# Patient Record
Sex: FEMALE | Race: WHITE | NOT HISPANIC OR LATINO | URBAN - METROPOLITAN AREA
[De-identification: names, ages, dates, MRNs, and addresses within clinical notes are randomized per-mention and may not be internally consistent; named-entity substitution may affect disease eponyms.]

---

## 2018-09-06 ENCOUNTER — TRANSCRIBE ORDERS (OUTPATIENT)
Dept: ADMINISTRATIVE | Age: 73
End: 2018-09-06

## 2018-09-06 ENCOUNTER — APPOINTMENT (OUTPATIENT)
Dept: LAB | Age: 73
End: 2018-09-06
Payer: MEDICARE

## 2018-09-06 DIAGNOSIS — E03.9 MYXEDEMA HEART DISEASE: ICD-10-CM

## 2018-09-06 DIAGNOSIS — I51.9 MYXEDEMA HEART DISEASE: ICD-10-CM

## 2018-09-06 DIAGNOSIS — J44.9 OBSTRUCTIVE CHRONIC BRONCHITIS WITHOUT EXACERBATION (HCC): ICD-10-CM

## 2018-09-06 DIAGNOSIS — C34.11 MALIGNANT NEOPLASM OF UPPER LOBE OF RIGHT LUNG (HCC): ICD-10-CM

## 2018-09-06 DIAGNOSIS — R00.1 SEVERE SINUS BRADYCARDIA: ICD-10-CM

## 2018-09-06 DIAGNOSIS — Z72.0 TOBACCO ABUSE: ICD-10-CM

## 2018-09-06 DIAGNOSIS — I35.0 NODULAR CALCIFIC AORTIC VALVE STENOSIS: ICD-10-CM

## 2018-09-06 DIAGNOSIS — Z76.89 REFERRAL OF PATIENT WITHOUT EXAMINATION OR TREATMENT: Primary | ICD-10-CM

## 2018-09-06 DIAGNOSIS — Z76.89 REFERRAL OF PATIENT WITHOUT EXAMINATION OR TREATMENT: ICD-10-CM

## 2018-09-06 LAB
CHOLEST SERPL-MCNC: 205 MG/DL (ref 50–200)
HDLC SERPL-MCNC: 75 MG/DL (ref 40–60)
LDLC SERPL CALC-MCNC: 117 MG/DL (ref 0–100)
NONHDLC SERPL-MCNC: 130 MG/DL
TRIGL SERPL-MCNC: 63 MG/DL
TSH 30M P TRH SERPL-ACNC: 3.15 UIU/ML
TSH SERPL DL<=0.05 MIU/L-ACNC: 3.15 UIU/ML

## 2018-09-06 PROCEDURE — 36415 COLL VENOUS BLD VENIPUNCTURE: CPT

## 2018-09-06 PROCEDURE — 84443 ASSAY THYROID STIM HORMONE: CPT

## 2018-09-06 PROCEDURE — 80061 LIPID PANEL: CPT

## 2020-01-07 ENCOUNTER — NURSING HOME VISIT (OUTPATIENT)
Dept: GERIATRICS | Facility: OTHER | Age: 75
End: 2020-01-07
Payer: MEDICARE

## 2020-01-07 VITALS
DIASTOLIC BLOOD PRESSURE: 82 MMHG | TEMPERATURE: 98 F | WEIGHT: 97 LBS | OXYGEN SATURATION: 98 % | RESPIRATION RATE: 20 BRPM | HEART RATE: 92 BPM | SYSTOLIC BLOOD PRESSURE: 102 MMHG

## 2020-01-07 DIAGNOSIS — C34.11 CANCER OF UPPER LOBE OF RIGHT LUNG (HCC): ICD-10-CM

## 2020-01-07 DIAGNOSIS — R63.4 WEIGHT LOSS: ICD-10-CM

## 2020-01-07 DIAGNOSIS — G25.81 RESTLESS LEG: ICD-10-CM

## 2020-01-07 DIAGNOSIS — E03.9 ACQUIRED HYPOTHYROIDISM: ICD-10-CM

## 2020-01-07 DIAGNOSIS — F17.200 TOBACCO USE DISORDER: ICD-10-CM

## 2020-01-07 DIAGNOSIS — R26.2 AMBULATORY DYSFUNCTION: ICD-10-CM

## 2020-01-07 DIAGNOSIS — J44.1 CHRONIC OBSTRUCTIVE PULMONARY DISEASE WITH ACUTE EXACERBATION (HCC): Primary | ICD-10-CM

## 2020-01-07 DIAGNOSIS — F32.A DEPRESSIVE DISORDER: ICD-10-CM

## 2020-01-07 DIAGNOSIS — D64.9 ANEMIA, UNSPECIFIED TYPE: ICD-10-CM

## 2020-01-07 PROBLEM — S32.10XD CLOSED FRACTURE OF SACRUM WITH ROUTINE HEALING: Status: ACTIVE | Noted: 2017-09-03
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
## 2020-01-07 PROBLEM — R26.9 IMPAIRED GAIT: Status: ACTIVE | Noted: 2017-10-23

## 2020-01-07 PROBLEM — J30.9 ALLERGIC RHINITIS: Status: ACTIVE | Noted: 2018-08-13

## 2020-01-07 PROBLEM — S32.592D FRACTURE OF MULTIPLE PUBIC RAMI, LEFT, WITH ROUTINE HEALING, SUBSEQUENT ENCOUNTER: Status: ACTIVE | Noted: 2017-09-03

## 2020-01-07 PROCEDURE — 99305 1ST NF CARE MODERATE MDM 35: CPT | Performed by: FAMILY MEDICINE

## 2020-01-07 NOTE — PROGRESS NOTES
[facility]   [address], [place], [address] [address]  History and Physical  POS: 31    Records Reviewed include: Hospital records      Chief Complaint/ Reason for Admission:   COPD exacerbation, RUL adenocarcinoma, ambulatory dysfunction    History of Present Illness:      Ms Suellen Perez is a 68 yo female who was recently admitted to [facility] for COPD exacerbation with respiratory insufficiency, currently improved will be admitted to [facility] for [address]  Regarding COPD will continue nebulizer treatment, continue predisone taper and O2 supplements  She reports intermittent cough and SOB, no fever, chills  Discussed about smoking cessation , however [patient is not ready to quit  She also has lung ADK RUL s/p resection, follows up with oncology  Anemia - currently stable, will continue to trend CBC  Ambulatory dysfunction- will continue PT/OT and goal is to return home  She noted that she lost weight lately , not able to specify amount, will consult RD                  Allergies    Allergies   Allergen Reactions    Codeine     Molds & Smuts Cough    Morphine     Penicillins Hives     Reaction Date: [id number];   DR ADKINS SAYS OK FOR CEPHALOSPORINSSOB      Sulfamethoxazole-Trimethoprim Rash     Reaction Date: [id number];          Past Medical History  Past Medical History:   Diagnosis Date    Cancer ([facility] [address] )     COPD (chronic obstructive pulmonary disease) ([facility] [address] )     Depression         Past Surgical History:   Procedure Laterality Date    EYE SURGERY      LUNG SURGERY Right     TONGUE SURGERY         Family History  Non contributory    Social History  Social History     Tobacco Use   Smoking Status Current Every Day Smoker   Smokeless Tobacco Never Used      Social History     Substance and Sexual Activity   Alcohol Use Not on file      Social History     Substance and Sexual Activity   Drug Use Not on file        Lives: Home,  Social Support: family  Fall in the past 12 months: yes  Use of assistance Device: Wheelchair    Physical Exam    Vital Signs    Vitals:    01/07/20 1716   BP: 102/82   Pulse: 92   Resp: 20   Temp: 98 °F (36 7 °C)   SpO2: 98%           Constitutional: Frail appearing patient       Physical Exam   Constitutional: She is oriented to person, place, and time  No distress  HENT:   Head: Normocephalic and atraumatic  Eyes: Pupils are equal, round, and reactive to light  EOM are normal  Right eye exhibits no discharge  Left eye exhibits no discharge  Neck: Normal range of motion  Neck supple  Cardiovascular: Normal rate and regular rhythm  Exam reveals distant heart sounds  No murmur heard  Pulmonary/Chest: Effort normal  No respiratory distress  She has wheezes (diffuse)  Abdominal: Soft  There is no tenderness  There is no rebound and no guarding  Musculoskeletal: She exhibits no edema  Neurological: She is alert and oriented to person, place, and time  Skin: Skin is warm and dry  She is not diaphoretic  Psychiatric: Her behavior is normal    Nursing note and vitals reviewed  Review of Systems:  Review of Systems   Constitutional: Positive for appetite change, fatigue and unexpected weight change  Negative for chills and fever  HENT: Negative for congestion and rhinorrhea  Respiratory: Positive for cough, shortness of breath and wheezing  Cardiovascular: Negative for chest pain, palpitations and leg swelling  Gastrointestinal: Negative for abdominal pain and constipation  Endocrine: Negative for cold intolerance  Genitourinary: Negative for difficulty urinating, dysuria and hematuria  Musculoskeletal: Positive for gait problem  Skin: Negative for wound  Allergic/Immunologic: Negative for environmental allergies  Neurological: Negative for dizziness and seizures  Hematological: Does not bruise/bleed easily  Psychiatric/Behavioral: Negative for behavioral problems and sleep disturbance  List of Current Medications:    Medication reviewed   All orders signed  Complete list is in the paper chart  Allergies    Allergies   Allergen Reactions    Codeine     Molds & Smuts Cough    Morphine     Penicillins Hives     Reaction Date: [id number];   DR ADKINS SAYS OK FOR CEPHALOSPORINSSOB      Sulfamethoxazole-Trimethoprim Rash     Reaction Date: [id number];          Labs/Diagnostics (reviewed by this provider): I personally reviewed lab results and imaging studies  Full reports are in the paper chart  Assessment/Plan:    Chronic obstructive pulmonary disease (HCC)  S/P acute exacerbation  Continue nebulizers : albuterol, brovana, budesonide, incruse ellipta, levalbuterol  Continue O2 supplements as needed , currently on 4 L NC  She was using O2 prior to admission  Will continue prednisone taper  Tomorrow will start 30 mg daily for 3 days, then 20 mg daily for 3 days, and then 10 mg daily for 3 days  Will follow up with Pulm as outpatient    Anemia  Currently stable, will monitor CBC    Cancer of upper lobe of right lung [facility])  S/p surgery, follows up with oncology  Depressive disorder  Currently stable denies SI/HI  Continue sertraline    Tobacco use disorder  Discussed smoking cessation, patient is not ready to quit at this time  Offered support  Restless leg  Continue pramipexole and mirtazapine for sleep  Follows up with Neurology    Hypothyroidism  Continue synthroid  Asymptomatic  Will continue to monitor TSH  Ambulatory dysfunction  Will continue PT/OT  Placed on fall precautions  The goal is to return home    Weight loss  Subjective weight loss reported by the patient  Will check CMP and consult RD  Pain: no  Rehab Potential:Good  Patient Informed of Medical Condition: yes   Patient is Capable of Understanding Their Right: yes   Discharge Plan: home  Vaccination:   There is no immunization history on file for this patient    Advanced Directives: yes: Yes   Code status:DNR (Per discussion with resident or POA)  PCP: MD Alyx Childress MD  Geriatric Medicine  5/2/75918:86 PM

## 2020-01-07 NOTE — ASSESSMENT & PLAN NOTE
S/P acute exacerbation  Continue nebulizers : albuterol, brovana, budesonide, incruse ellipta, levalbuterol  Continue O2 supplements as needed , currently on 4 L NC  She was using O2 prior to admission  Will continue prednisone taper  Tomorrow will start 30 mg daily for 3 days, then 20 mg daily for 3 days, and then 10 mg daily for 3 days    Will follow up with Pulm as outpatient

## 2020-01-10 ENCOUNTER — NURSING HOME VISIT (OUTPATIENT)
Dept: GERIATRICS | Facility: OTHER | Age: 75
End: 2020-01-10
Payer: MEDICARE

## 2020-01-10 DIAGNOSIS — J44.1 CHRONIC OBSTRUCTIVE PULMONARY DISEASE WITH ACUTE EXACERBATION (HCC): Primary | ICD-10-CM

## 2020-01-10 PROCEDURE — 99308 SBSQ NF CARE LOW MDM 20: CPT | Performed by: NURSE PRACTITIONER

## 2020-01-13 ENCOUNTER — NURSING HOME VISIT (OUTPATIENT)
Dept: GERIATRICS | Facility: OTHER | Age: 75
End: 2020-01-13
Payer: MEDICARE

## 2020-01-13 DIAGNOSIS — J44.1 CHRONIC OBSTRUCTIVE PULMONARY DISEASE WITH ACUTE EXACERBATION (HCC): Primary | ICD-10-CM

## 2020-01-13 DIAGNOSIS — C34.11 CANCER OF UPPER LOBE OF RIGHT LUNG (HCC): ICD-10-CM

## 2020-01-13 PROCEDURE — 99309 SBSQ NF CARE MODERATE MDM 30: CPT | Performed by: NURSE PRACTITIONER

## 2020-01-13 NOTE — PROGRESS NOTES
[facility]  [address]  [phone]  [facility]  POC [address]    NAME: Vadim Roque  AGE: 69 y o  SEX: female    DATE OF ENCOUNTER: 1/13/2020    Assessment and Plan     Chronic obstructive pulmonary disease ([facility] [address] )  Patient is in process of having a prednisone taper  Today was her last day of 20 mg  She is scheduled to start 10 mg daily tomorrow however due to her present symptoms it appears that the taper may have been too quick for her so we will increase the prednisone to 30 mg daily starting today and will taper by 5 mg every 5 days  Continue O2 at 4 L via nasal cannula  Continue Brovana 15 mcg in 2 mL solution 1 vial 2 times a day  Continue budesonide 0 5 mg in 2 mL suspension 1 vial via nebulizer twice a day  Continue Incruse Ellipta 62 5 mcg inhalant 1 puff daily  Continue albuterol nebulizer every 4 hours as needed for wheezing  Continue leave albuterol HFA as needed  Monitor respiratory status  Cancer of upper lobe of right lung [facility])  Status post surgery, follow-up with Oncology  Chief Complaint     Follow-up Note     History of Present Illness     Requested by nurse to evaluate patient for complaints of increased shortness of breath  Nurse reports this started last night patient appeared to have increased shortness of breath which continued into today  Patient presently is on a tapering dose of prednisone  Today is her last day of 20 mg and she is scheduled to receive 10 mg daily after that  Patient was seen and examined at bedside  She denies chest pain, abdominal pain, nausea, vomiting, diarrhea, constipation, headache, dizziness, pain  She states she is more short of breath than usual   She does not want to set up she would rather lay down  She is presently on 4 L of oxygen via nasal cannula  Medications reviewed  Review of Systems     ROS as per noted in HPI      Objective     Vitals:  blood pressure 113/85, pulse 89 and irregular, respirations 20, temperature 97 3°  Pulse ox on 4 L O2 via nasal cannula- 99%    Physical Exam   Constitutional: She is oriented to person, place, and time  Patient was laying in bed in a fetal position  She was able to sit up without difficulty but wanted to lay down as soon as possible after the exam        Cardiovascular: Normal rate and normal heart sounds  Exam reveals no gallop and no friction rub  No murmur heard  Irregularly irregular rhythm  Pulmonary/Chest: She has wheezes  She has no rales  Increased respiratory effort noted  Expiratory wheezing throughout  Breath sounds decreased in all fields  O2 at 4 L via nasal cannula intact  Abdominal: Soft  Bowel sounds are normal  There is no tenderness  There is no rebound and no guarding  Neurological: She is alert and oriented to person, place, and time  Skin: Skin is warm and dry  Nursing note and vitals reviewed  Current Medications   Medications were reviewed and updated in facility chart       MORRO Neri  1/13/2020 9:57 AM

## 2020-01-13 NOTE — ASSESSMENT & PLAN NOTE
Patient is in process of having a prednisone taper  Today was her last day of 20 mg  She is scheduled to start 10 mg daily tomorrow however due to her present symptoms it appears that the taper may have been too quick for her so we will increase the prednisone to 30 mg daily starting today and will taper by 5 mg every 5 days  Continue O2 at 4 L via nasal cannula  Continue Brovana 15 mcg in 2 mL solution 1 vial 2 times a day  Continue budesonide 0 5 mg in 2 mL suspension 1 vial via nebulizer twice a day  Continue Incruse Ellipta 62 5 mcg inhalant 1 puff daily  Continue albuterol nebulizer every 4 hours as needed for wheezing  Continue leave albuterol HFA as needed  Monitor respiratory status
